# Patient Record
Sex: MALE | Race: WHITE | NOT HISPANIC OR LATINO | Employment: FULL TIME | URBAN - METROPOLITAN AREA
[De-identification: names, ages, dates, MRNs, and addresses within clinical notes are randomized per-mention and may not be internally consistent; named-entity substitution may affect disease eponyms.]

---
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
## 2024-05-01 ENCOUNTER — OCCUPATIONAL HEALTH (OUTPATIENT)
Dept: URGENT CARE | Facility: CLINIC | Age: 24
End: 2024-05-01

## 2024-05-01 DIAGNOSIS — Z76.89 RETURN TO WORK EVALUATION: Primary | ICD-10-CM

## 2024-05-01 PROCEDURE — 99499 UNLISTED E&M SERVICE: CPT | Mod: S$GLB,,, | Performed by: NURSE PRACTITIONER

## 2024-05-01 RX ORDER — LEVETIRACETAM 750 MG/1
750 TABLET ORAL 2 TIMES DAILY
COMMUNITY
Start: 2024-04-18

## 2024-10-23 ENCOUNTER — TELEPHONE (OUTPATIENT)
Dept: GENETICS | Facility: CLINIC | Age: 24
End: 2024-10-23
Payer: COMMERCIAL

## 2024-11-07 ENCOUNTER — TELEPHONE (OUTPATIENT)
Dept: GENETICS | Facility: CLINIC | Age: 24
End: 2024-11-07
Payer: COMMERCIAL

## 2024-11-07 NOTE — TELEPHONE ENCOUNTER
Pt rescheduled virtual 11/11 appt to in person 12/5 at 2:30pm. Pt understood and confirmed appt.      ----- Message from Isis Christine sent at 11/6/2024  2:32 PM CST -----  I see he's located in [place]. I'm not licensed in [place], so his appointment will need to be in person or he will need to be in [place] or [place] in order for me to do the virtual visit. Can you please call to let him know. If he can come in person, we can keep that day, otherwise he'll need to be rescheduled for a day where he can come in person.

## 2024-12-05 ENCOUNTER — OFFICE VISIT (OUTPATIENT)
Dept: GENETICS | Facility: CLINIC | Age: 24
End: 2024-12-05
Payer: COMMERCIAL

## 2024-12-05 ENCOUNTER — LAB VISIT (OUTPATIENT)
Dept: LAB | Facility: HOSPITAL | Age: 24
End: 2024-12-05
Payer: COMMERCIAL

## 2024-12-05 DIAGNOSIS — R56.9 SEIZURES: Primary | ICD-10-CM

## 2024-12-05 DIAGNOSIS — Z84.89 FAMILY HISTORY OF SEIZURES: ICD-10-CM

## 2024-12-05 DIAGNOSIS — R56.9 SEIZURES: ICD-10-CM

## 2024-12-05 LAB — MISCELLANEOUS GENETIC TEST NAME: NORMAL

## 2024-12-05 PROCEDURE — 99999 PR PBB SHADOW E&M-EST. PATIENT-LVL I: CPT | Mod: PBBFAC,,, | Performed by: MEDICAL GENETICS

## 2024-12-05 PROCEDURE — 96040 PR GENETIC COUNSELING, EACH 30 MIN: CPT | Mod: S$GLB,,,

## 2024-12-05 PROCEDURE — 36415 COLL VENOUS BLD VENIPUNCTURE: CPT

## 2024-12-05 NOTE — PROGRESS NOTES
"  [facility]etics Evaluation - New Patient       Genetics History:  Wellington Lion is a 23 y.o. old male who is sent for consultation at the request of Self, Aaarefconradal for genetics evaluation of epilepsy. This patient was seen in [facility] by physician Dr. Danyelle Sorto and Genetic counselor Xavi Muro. The patient was accompanied to clinic by mother.    Intake copied from  note:  Wellington is reported to have initially had seizure-like episodes when he was 6 months old until he was 7yo. Per mother's report his doctors indicated these were febrile seizures but a fever did not always accompany the seizure. He was seen by a neurologist at 7yo and put on medication (family unable to recall specific medication) which he had taken for one year. He did not have seizures again until he was around 15yo. Wellington reports that he has about 1-2 seizures per year that is accompanied by migraine with aura (notes feeling "like a balloon is inside his head that eventually pops"). His most recent seizure was in October 2024. He is currently followed by neurology in [place] (Dr. Álvaro Kovacs) and has been prescribed Keppra. EEG completed April 2024 and brain MRI completed May 2024 were unremarkable. Genetic evaluation was recommended by his neurologist to determine a possible underlying genetic etiology for his seizures.       Previous Genetic Testing:   none    Review of systems:   Complete ROS performed and otherwise negative except as noted above in the history (systems covered: General, Eyes, ENT, CV, Resp, GI, , MSK, Derm, Neuro, Psych, Endo, Heme/lymph, Allergic / Immunologic).    Histories:    Past Medical History:  No past medical history on file.     Past Surgical History:  No past surgical history on file.      Family History: Refer to  note for pedigree  Wellington has one maternal half-sister (19yo) with a history of congenital deafness in her left ear. His " mother (42yo) has no reported health concerns and no history of seizures. One maternal cousin (10yo) has a history of autism and intellectual disability. One maternal half-cousin (11yo) has a history of autism and epilepsy. One maternal half-aunt is reported to have  under one year old from SIDS; her identical twin sister (29yo) has no reported health concerns. Maternal grandmother (68yo) has no reported health concerns. Maternal grandfather (70yo) has a history of high blood pressure, high cholesterol, and diabetes. One maternal great-aunt is reported to have  suddenly in her sleep between 50-59yo; she had one son (45yo, paternal cousin to Wellington's mother) with a history of seizures as a child. Another maternal great aunt  in her 60s, is reported to have had seizures. No information is available on paternal relatives. Birth defects, recurrent miscarriage, and stillbirth were denied. Consanguinity was denied.      Allergies:  Review of patient's allergies indicates:   Allergen Reactions    Sertraline          Medications:    Current Outpatient Medications:     levETIRAcetam (KEPPRA) 750 MG Tab, Take 750 mg by mouth 2 (two) times daily., Disp: , Rfl:       Physical exam:    Examination:  General: Alert and oriented, No acute distress.    Appearance: Well nourished, normally developed.    Behavior: Within normal limits.    Skin: Normal for ethnicity, no significant birthmarks or pigmentary changes; hair is normal in texture and distribution       Craniofacial exam:         - Head circumference 59cm        - Normal hair pattern, distribution and texture         - Eyes are symmetric and normal, with normal spacing and shape; eyelashes are normal        - Ears: normal in appearance and placement        - Nose: normal appearance, with normal philtrum        - Mouth: normal shape; normal lips; intact palate with normal shape; teeth are normal in appearance    Neck:  Supple, normal range of motion; no  thyromegaly.    Chest:  Normal appearance, no pectus. Nipples are normal in appearance and placement.    Cardiovascular: RRR without murmur  Respiratory:  Respirations are non-labored.    Abdomen: Soft, non-tender, with no hepatosplenomegaly  Genitalia: exam deferred   Upper extremity exam: normal - digits appear normal with normal palmar and digital creases and fingernails.   Lower extremity exam: normal, toes appear normal with normal toe nails.   Neurologic: No focal deficits noted    Diagnostic Studies Reviewed:  EEG ([facility], 2024): normal awake, drowsy, and sleep study    MRI brain with and without contrast (2024): no intracranial lesion       Assessment:  Wellington is a 23 y.o. old male who was evaluated today in genetics clinic due to epilepsy. Family history is significant for autism, epilepsy and intellectual disability. Previous MRI and EEG were reportedly unremarkable.    Epilepsy is a common neurological disorder that affects at least 0.8% of the population. Epilepsy can be caused by genetic disorders, metabolic diseases, trauma, infection, and structural brain abnormalities, although the cause is not known in many cases.     A genetic etiology underlies epilepsy in approximately 40% of individuals. Genes have been identified that cause both generalized seizures and focal seizures, as well as unclassified epilepsy types such as infantile spasms. The genetic etiology of idiopathic generalized epilepsy (IGE) is frequently complex because it is due to a combination of multiple genetic factors that each confer a small risk for epilepsy and may be modified by environmental influences.     Currently, approximately 2% of patients with IGE harbor an identifiable variant in a single gene associated with Mendelian inheritance of epilepsy. However, the percentage of patients with Mendelian epilepsy is higher for specific epilepsy types such as infantile spasms, benign familial  and -infantile  seizures (BFNS and BFNIS), and others.    The inheritance pattern can be autosomal dominant, autosomal recessive, or X-linked. Pathogenic variants in a single gene may be associated with different types of seizures (clinical heterogeneity), and conversely, pathogenic variants in different genes can cause the same epilepsy phenotype (genetic heterogeneity).  Knowledge of the genetic etiology of epilepsy may guide selection of the most appropriate treatment options in some cases.    In guidelines endorsed by the American Epilepsy Society, the National Society of Genetic Counselors recommends Whole Exome Sequencing (MERRICK) for individuals with unexplained epilepsies without limitation to age, so Wellington and his mom were consented for MERRICK during today's appointment by genetic counselor Xavi.    Plan:  - Send [facility] duo exome sequencing  - Follow up depends on the result of the workup      Face to Face time with patient: 12 minutes  46 minutes of total time spent on the encounter, which includes face to face time and non-face to face time preparing to see the patient (eg, review of tests), Obtaining and/or reviewing separately obtained history, Documenting clinical information in the electronic or other health record, Independently interpreting results (not separately reported) and communicating results to the patient/family/caregiver, or Care coordination (not separately reported).          Danyelle Sorto MD  Medical Genetics  [facility]    It was a pleasure to see Wellington today.  Follow up depend on the results of the workup above.. Should any questions or concerns arise following today's visit, we encourage the patient to contact the Genetics Office.

## 2024-12-05 NOTE — PROGRESS NOTES
"NEW PATIENT GENETIC COUNSELING CONSULTATION     Wellington Lion  DOS: 2024  : 2000  MRN: [id number]     REFERRING MD: Valencia Self     REASON FOR CONSULT: Our Genetic Counseling Service was asked to consult this 23 y.o.  male  regarding epilepsy. He is accompanied for today's genetic counseling appointment by his mother.     HISTORY OF PRESENT ILLNESS: Wellington Lion  is a 23 y.o.  male  referred to [facility] regarding epilepsy.    Wellington is reported to have initially had seizure-like episodes when he was 6 months old until he was 7yo. Per mother's report his doctors indicated these were febrile seizures but a fever did not always accompany the seizure. He was seen by a neurologist at 7yo and put on medication (family unable to recall specific medication) which he had taken for one year. He did not have seizures again until he was around 15yo. Wellington reports that he has about 1-2 seizures per year that is accompanied by migraine with aura (notes feeling "like a balloon is inside his head that eventually pops"). His most recent seizure was in 2024. He is currently followed by neurology in [place] (Dr. Álvaro Kovacs) and has been prescribed Keppra. EEG completed 2024 and brain MRI completed May 2024 were unremarkable. Genetic evaluation was recommended by his neurologist to determine a possible underlying genetic etiology for his seizures.      MEDICAL HISTORY:   Active Ambulatory Problems     Diagnosis Date Noted    No Active Ambulatory Problems     Resolved Ambulatory Problems     Diagnosis Date Noted    No Resolved Ambulatory Problems     No Additional Past Medical History     RELEVANT LABS/IMAGING:    EEG ([facility], 2024):      MRI brain with and without contrast ([facility], 2024)       FAMILY HISTORY:       Wellington has one maternal half-sister (19yo) with a history of congenital deafness in her left ear. His mother (42yo) has no reported health concerns and no " history of seizures. One maternal cousin (10yo) has a history of autism and intellectual disability. One maternal half-cousin (13yo) has a history of autism and epilepsy. One maternal half-aunt is reported to have  under one year old from SIDS; her identical twin sister (29yo) has no reported health concerns. Maternal grandmother (68yo) has no reported health concerns. Maternal grandfather (70yo) has a history of high blood pressure, high cholesterol, and diabetes. One maternal great-aunt is reported to have  suddenly in her sleep between 50-61yo; she had one son (45yo, paternal cousin to Wellington's mother) with a history of seizures as a child. Another maternal great aunt  in her 60s, is reported to have had seizures. No information is available on paternal relatives. Birth defects, recurrent miscarriage, and stillbirth were denied. Consanguinity was denied.     IMPRESSION: Wellington Lion  is a 23 y.o.  male  with a history of epilepsy.     Epilepsy is the occurrence of two of more unprovoked seizures or one unprovoked seizures with a propensity for others. A majority of unexplained epilepsy is estimated to have an underlying genetic etiology. A genetic diagnosis for an individual with epilepsy may have direct clinical implications by informing choice of anti-seizure medication, reducing additional invasive tests or procedures, establishing eligibility for clinical trials, and informing reproductive decision-making and/or cascade testing for the patient or their at-risk relatives. Possible genetic causes of epilepsy include copy number variants, single gene disorders, metabolic conditions, and multifactorial inheritance.     In guidelines endorsed by the American Epilepsy Society, the National Society of Genetic Counselors recommends Whole Exome Sequencing (MERRICK) for individuals with unexplained epilepsies without limitation to age, so Wellington was consented for MERRICK during today's appointment.     MERRICK  is one of the most comprehensive tests available clinically and is used to look for mutations or likely pathogenic variants the body's exome, which includes over 20,000 genes. We discussed that [facility] will use Wellington's clinical information when analyzing results and that performing the test as a trio involving the whole family allows [facility] to delineate the significance of any variants identified. Wellington's test will be run as a duo with his mother.     We discussed the limitations and possible results involved in MERRICK. A diagnosis is found in about 25% of those who have had MERRICK testing. A positive result may explain Wellington's symptoms and can be informative for the family. A negative MERRICK result does not rule out that the underlying condition is heritable in nature and reanalysis or additional genetic testing may be possible in the future. We also discussed that variants of uncertain significance (VUS), or changes in a gene with unknown clinical significance are possible. MERRICK cannot detect certain genetic changes such as deletions, duplications, trinucleotide repeats, or methylation defects.      We discussed that the family can opt out of receiving incidental or secondary findings from the MERRICK. These findings are included in the ACMG's list clinically actionable conditions. About 4% of patients who undergo MERRICK receive an incidental finding. These genes are involved in various conditions such as hereditary cancer syndromes, cardiac, neurological, and kidney diseases. We also discussed that unexpected results such as nonpaternity or consanguinity may be revealed.      We also spent time discussing the Genetic Information Nondiscrimination Act (VICENTE), which protects individuals from discrimination on the basis of genetic information from medical insurance providers and employers. However, the law does not cover life insurance, disability, or long-term care insurance. This law also does not apply to certain employers,  including members of the [place] , Federal Government employees, and employers with less than 15 employees. Additional information about VICENTE can be found at https://ginahelp.org/.     Wellington and his mother consented to move forward with MERRICK and opted IN to receive secondary findings. Wellington also opted IN to be contacted for research opportunities. Follow-up will depend on results.     RECOMMENDATIONS/PLAN:  Whole Exome Sequencing Duo; samples collected today; TAT 4-6 weeks  Follow-up pending results  Please see Dr. Sorto's note for any additional recommendations    REFERENCES:  GRACE Reed., JEFFERY Whitfield, RAFAL Treadwell., SHARRI Lau, CHRISTINA Stallworth., DIANE Malik., HETAL Lopez., LYNDSAY Schwarz, & RAFAL Liriano (2022). Genetic testing for the epilepsies: A systematic review. Epilepsia, 63(2), 375-387. https://doi.org/10.1111/epi.56301  Nahid Torres, Loc SANCHEZ, et al. Genetic testing and counseling for the unexplained epilepsies: An evidence-based practice guideline of the National Society of Genetic Counselors. J Divya Couns. 2022 Oct 24. doi.org/10.1002/jgc4.1646      TIME SPENT: 45 minutes with over 50% spent counseling    Xavi Muro MS, [facility] [place]  Certified Genetic Counselor   [facility]

## 2024-12-06 PROBLEM — G40.909 EPILEPSY: Status: ACTIVE | Noted: 2024-12-06

## 2024-12-27 ENCOUNTER — TELEPHONE (OUTPATIENT)
Dept: GENETICS | Facility: CLINIC | Age: 24
End: 2024-12-27
Payer: COMMERCIAL

## 2024-12-27 NOTE — TELEPHONE ENCOUNTER
Spoke with Wellington to review MERRICK+yamilka results which returned negative/normal. Reviewed recommendation by Dr. Sorto for Wellington to follow-up with genetics in 18-24 months for reanalysis of this test/to determine additional recommendations at that time based on any updates to his medical history.    Wellington verbalized understanding of this information and all questions were answered. Copy of results sent through [facility] portal.